# Patient Record
Sex: FEMALE | URBAN - METROPOLITAN AREA
[De-identification: names, ages, dates, MRNs, and addresses within clinical notes are randomized per-mention and may not be internally consistent; named-entity substitution may affect disease eponyms.]

---

## 2023-09-01 ENCOUNTER — OFFICE VISIT (OUTPATIENT)
Dept: URBAN - METROPOLITAN AREA CLINIC 63 | Facility: CLINIC | Age: 53
End: 2023-09-01

## 2023-09-01 ENCOUNTER — DASHBOARD ENCOUNTERS (OUTPATIENT)
Age: 53
End: 2023-09-01

## 2023-09-01 NOTE — HPI-PREVIOUS IMAGING
CT abdomen and pelvis without contrast/28 August 2023.  1.  There is biliary ductal dilation with the common bile duct measuring 1.2 cm. No obstructing calcified stone or mass identified. Recommend correlation with liver function tests. MRCP can be performed if there is concern for biliary obstruction.  2.  No findings concerning for bowel obstruction.

## 2023-09-01 NOTE — HPI-PREVIOUS LABS
Lab work dated 28 August 2023 demonstrates the following abnormalities: Neutrophils 35.3%, lymphocytes 56.3%, BUN 6, total protein 8.6, albumin 5.3, AST 38.  All remaining lab values of CBC, CMP, lipase, urine hCG and urinalysis are negative or within normal limits.

## 2023-11-20 NOTE — HPI-TODAY'S VISIT:
New patient: Chronic constipation, ED follow-up  PMH: Depression, substance abuse, tobacco dependency PSH:  As per [facility] ED provider note dated 28 August 2023 ([facility]): "The patient is a 53 y.o. female with past medical history of mood disorder who presents to the emergency department via Walk-in with a chief complaint of constipation.  States she has not had a bowel movement for the past 40 days.  She takes Suboxone regularly.  She states she has not had this problem with taking Suboxone before.  She has been taking it for 2 years.  She has tried multiple over-the-counter medications without relief.  She states on day 21 she did have a small amount but nothing significant.  She states she feels bloated.  She reports abdominal pain and back pain.  She has some nausea but no vomiting.  She denies fevers, chills.  She denies history of abdominal surgeries or history of obstruction.  She appears in no acute distress. ED course:  Pt states she has not had a bowel movement in 40 days. Has been trying enemas, suppositories, prunes, mialax without relief. States she had a small amount on day 21 but no significant movement. Feels bloated and c/o back pain. Takes suboxone. Has been taking for the past 2 years and states she has not had this problem before.  Discussed use of reslistor for opiate induced constipation. Will d/c with prescription. Given GI specialist for follow up. Reasons for more urgent return were discussed. D/c home stable condition. Impression/Diagnosis(es): The encounter diagnosis was Constipation due to opioid therapy." [facility] PRE-SURGICAL TESTING INSTRUCTIONS                      PRIOR TO PROCEDURE DATE:    1. PLEASE FOLLOW ANY INSTRUCTIONS GIVEN TO YOU PER YOUR SURGEON. 2. Arrange for someone to drive you home and be with you for the first 24 hours after discharge for your safety after your procedure for which you received sedation. Ensure it is someone we can share information with regarding your discharge. NOTE: At this time ONLY 2 ADULTS may accompany you   One person ENCOURAGED to stay at hospital entire time if outpatient surgery      3. You must contact your surgeon for instructions IF:  You are taking any blood thinners, aspirin, anti-inflammatory or vitamins. There is a change in your physical condition such as a cold, fever, rash, cuts, sores, or any other infection, especially near your surgical site. 4. Do not drink alcohol the day before or day of your procedure. Do not use any recreational marijuana at least 24 hours or street drugs (heroin, cocaine) at minimum 5 days prior to your procedure. 5. A Pre-Surgical History and Physical MUST be completed WITHIN 30 DAYS OR LESS prior to your procedure. by your Physician or an Urgent Care        THE DAY OF YOUR PROCEDURE:  1. Follow instructions for ARRIVAL TIME as DIRECTED BY YOUR SURGEON. 2. Enter the MAIN entrance from [employer] and follow the signs to the free Parking [employer] or [employer] (offered free of charge 7 am-5pm). 3. Enter the Main Entrance of the hospital (do not enter from the lower level of the parking garage). Upon entrance, check in with the  at the surgical information desk on your LEFT. Bring your insurance card and photo ID to register      4. DO NOT EAT ANYTHING 8 hours prior to arrival for surgery. You may have up to 8 ounces of water 4 hours prior to your arrival for surgery.    NOTE: ALL Gastric, Bariatric & Bowel surgery patients - you MUST follow your surgeon's instructions regarding